# Patient Record
Sex: MALE | Race: WHITE
[De-identification: names, ages, dates, MRNs, and addresses within clinical notes are randomized per-mention and may not be internally consistent; named-entity substitution may affect disease eponyms.]

---

## 2017-02-21 ENCOUNTER — HOSPITAL ENCOUNTER (EMERGENCY)
Dept: HOSPITAL 17 - PHEFT | Age: 37
Discharge: HOME | End: 2017-02-21
Payer: COMMERCIAL

## 2017-02-21 VITALS
DIASTOLIC BLOOD PRESSURE: 99 MMHG | TEMPERATURE: 98.7 F | RESPIRATION RATE: 20 BRPM | SYSTOLIC BLOOD PRESSURE: 147 MMHG | OXYGEN SATURATION: 97 % | HEART RATE: 90 BPM

## 2017-02-21 VITALS — HEIGHT: 71 IN | WEIGHT: 196.21 LBS | BODY MASS INDEX: 27.47 KG/M2
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
## 2017-02-21 DIAGNOSIS — M25.462: Primary | ICD-10-CM

## 2017-02-21 DIAGNOSIS — M25.562: ICD-10-CM

## 2017-02-21 DIAGNOSIS — Z72.0: ICD-10-CM

## 2017-02-21 PROCEDURE — 73564 X-RAY EXAM KNEE 4 OR MORE: CPT

## 2017-02-21 PROCEDURE — L1830 KO IMMOB CANVAS LONG PRE OTS: HCPCS

## 2017-02-21 PROCEDURE — E0113 CRUTCH UNDERARM EACH WOOD: HCPCS

## 2017-02-21 PROCEDURE — 99283 EMERGENCY DEPT VISIT LOW MDM: CPT

## 2017-02-21 NOTE — RADHPO
EXAM DATE/TIME:  02/21/2017 21:04 

 

[facility] COMPARISON:     

No previous studies available for comparison.

 

                     

INDICATIONS :     

Left knee pain and swelling since last night when the patient heard a pop after wrestling.

                     

 

MEDICAL HISTORY :     

None.          

 

SURGICAL HISTORY :     

None.   

 

ENCOUNTER:     

Initial                                        

 

ACUITY:     

1 day      

 

PAIN SCORE:     

9/10

 

LOCATION:     

Left medial knee.

 

FINDINGS:     

Bones of the left knee are intact and normally aligned. There is a moderate joint effusion evident. E
xtra-articular soft tissues have a normal radiographic appearance.

 

CONCLUSION:     

Nonspecific joint effusion. No fracture or subluxation.

 

 

 

 Pierce Santamaria MD on February 21, 2017 at 21:15           

Board Certified Radiologist.

 This report was verified electronically.

## 2017-09-19 ENCOUNTER — HOSPITAL ENCOUNTER (EMERGENCY)
Dept: HOSPITAL 17 - PHED | Age: 37
Discharge: HOME | End: 2017-09-19
Payer: COMMERCIAL

## 2017-09-19 VITALS
RESPIRATION RATE: 16 BRPM | DIASTOLIC BLOOD PRESSURE: 68 MMHG | HEART RATE: 82 BPM | SYSTOLIC BLOOD PRESSURE: 153 MMHG | OXYGEN SATURATION: 99 %

## 2017-09-19 VITALS
RESPIRATION RATE: 20 BRPM | HEART RATE: 94 BPM | OXYGEN SATURATION: 97 % | DIASTOLIC BLOOD PRESSURE: 117 MMHG | TEMPERATURE: 98.4 F | SYSTOLIC BLOOD PRESSURE: 171 MMHG

## 2017-09-19 VITALS — HEIGHT: 71 IN | WEIGHT: 193.12 LBS | BODY MASS INDEX: 27.04 KG/M2

## 2017-09-19 DIAGNOSIS — F17.200: ICD-10-CM

## 2017-09-19 DIAGNOSIS — Z79.899: ICD-10-CM

## 2017-09-19 DIAGNOSIS — K29.20: Primary | ICD-10-CM

## 2017-09-19 LAB
ALP SERPL-CCNC: 72 U/L (ref 45–117)
ALT SERPL-CCNC: 112 U/L (ref 12–78)
ANION GAP SERPL CALC-SCNC: 7 MEQ/L (ref 5–15)
AST SERPL-CCNC: 58 U/L (ref 15–37)
BASOPHILS # BLD AUTO: 0 TH/MM3 (ref 0–0.2)
BASOPHILS NFR BLD: 0.8 % (ref 0–2)
BILIRUB SERPL-MCNC: 0.4 MG/DL (ref 0.2–1)
BUN SERPL-MCNC: 7 MG/DL (ref 7–18)
CHLORIDE SERPL-SCNC: 105 MEQ/L (ref 98–107)
EOSINOPHIL # BLD: 0.3 TH/MM3 (ref 0–0.4)
EOSINOPHIL NFR BLD: 4.2 % (ref 0–4)
ERYTHROCYTE [DISTWIDTH] IN BLOOD BY AUTOMATED COUNT: 13.3 % (ref 11.6–17.2)
GFR SERPLBLD BASED ON 1.73 SQ M-ARVRAT: 99 ML/MIN (ref 89–?)
HCO3 BLD-SCNC: 25.4 MEQ/L (ref 21–32)
HCT VFR BLD CALC: 44.7 % (ref 39–51)
HEMO FLAGS: (no result)
LYMPHOCYTES # BLD AUTO: 1.7 TH/MM3 (ref 1–4.8)
LYMPHOCYTES NFR BLD AUTO: 27.8 % (ref 9–44)
MAGNESIUM SERPL-MCNC: 1.9 MG/DL (ref 1.5–2.5)
MCH RBC QN AUTO: 31.7 PG (ref 27–34)
MCHC RBC AUTO-ENTMCNC: 34.4 % (ref 32–36)
MCV RBC AUTO: 92.2 FL (ref 80–100)
MONOCYTES NFR BLD: 7.2 % (ref 0–8)
NEUTROPHILS # BLD AUTO: 3.6 TH/MM3 (ref 1.8–7.7)
NEUTROPHILS NFR BLD AUTO: 60 % (ref 16–70)
PLATELET # BLD: 207 TH/MM3 (ref 150–450)
POTASSIUM SERPL-SCNC: 3.9 MEQ/L (ref 3.5–5.1)
RBC # BLD AUTO: 4.85 MIL/MM3 (ref 4.5–5.9)
SODIUM SERPL-SCNC: 137 MEQ/L (ref 136–145)
WBC # BLD AUTO: 6 TH/MM3 (ref 4–11)

## 2017-09-19 PROCEDURE — 96375 TX/PRO/DX INJ NEW DRUG ADDON: CPT

## 2017-09-19 PROCEDURE — 99284 EMERGENCY DEPT VISIT MOD MDM: CPT

## 2017-09-19 PROCEDURE — 96374 THER/PROPH/DIAG INJ IV PUSH: CPT

## 2017-09-19 PROCEDURE — 80053 COMPREHEN METABOLIC PANEL: CPT

## 2017-09-19 PROCEDURE — C9113 INJ PANTOPRAZOLE SODIUM, VIA: HCPCS

## 2017-09-19 PROCEDURE — 93005 ELECTROCARDIOGRAM TRACING: CPT

## 2017-09-19 PROCEDURE — 85025 COMPLETE CBC W/AUTO DIFF WBC: CPT

## 2017-09-19 PROCEDURE — 83735 ASSAY OF MAGNESIUM: CPT

## 2017-09-19 PROCEDURE — 96361 HYDRATE IV INFUSION ADD-ON: CPT

## 2017-09-19 PROCEDURE — 83690 ASSAY OF LIPASE: CPT

## 2017-09-19 NOTE — PD
HPI


Chief Complaint:  Alcohol/Drug Intoxication


Time Seen by Provider:  16:42


Travel History


International Travel<30 days:  No


Contact w/Intl Traveler<30days:  No


Traveled to known affect area:  No





History of Present Illness


HPI


This 36-year-old male says he been feeling a little fuzzy.  He is a nonalcohol 

gil for 5 or 6 days.  He has a history of alcohol abuse in the past.  He 

denies recent drug use.  He does have a history of hepatitis C.  He had some 

pain in the right upper quadrant which then moved over to the mid abdomen and 

lower chest area.  He has been feeling shaky.  There is been no vomiting or 

diarrhea.  Pain is mild to moderate at this time





PFS


Past Medical History


Depression:  Yes


Diminished Hearing:  No


Influenza Vaccination:  No


Pregnant?:  Not Pregnant





Past Surgical History


Surgical History:  No Previous Surgery


Abdominal Surgery:  Yes (Hernia)


Other Surgery:  Yes (Spider cyst )





Social History


Alcohol Use:  Yes (Occ.)


Tobacco Use:  Yes (< 1/2 PPD)


Substance Use:  No





Allergies-Medications


(Allergen,Severity, Reaction):  


Coded Allergies:  


     No Known Allergies (Verified , 9/19/17)


Reported Meds & Prescriptions





Reported Meds & Active Scripts


Active


No Active Prescriptions or Reported Medications    








Review of Systems


General / Constitutional:  No: Fever, Chills


Eyes:  No: Diploplia, Blurred Vision


HENT:  No: Headaches, Vertigo


Cardiovascular:  Positive: Palpitations, No: Chest Pain or Discomfort


Respiratory:  No: Cough, Shortness of Breath


Gastrointestinal:  No: Nausea, Vomiting


Genitourinary:  No: Urgency, Frequency


Skin:  No Rash


Neurologic:  No: Weakness


Psychiatric:  Positive: Anxiety, Substance Abuse





Physical Exam


Narrative


GENERAL: Well developed male.  There is a mild tremor


SKIN: Focused skin assessment warm/dry.


HEAD: Atraumatic. Normocephalic. 


EYES: Pupils equal and round. No scleral icterus. No injection or drainage. 


ENT: No nasal bleeding or discharge.  Mucous membranes pink and moist.


NECK: Trachea midline. No JVD. 


CARDIOVASCULAR: Regular rate and rhythm.  No murmur appreciated.


RESPIRATORY: No accessory muscle use. Clear to auscultation. Breath sounds 

equal bilaterally. 


GASTROINTESTINAL: Abdomen soft, epigastric and right upper quadrant tenderness 

without guarding or rigidity, nondistended. Hepatic and splenic margins not 

palpable. 


MUSCULOSKELETAL: No obvious deformities. No clubbing.  No cyanosis.  No edema. 


NEUROLOGICAL: Awake and alert. No obvious cranial nerve deficits.  Motor 

grossly within normal limits. Normal speech.


PSYCHIATRIC: Appropriate mood and affect; insight and judgment normal.





Data


Data


Last Documented VS





Vital Signs








  Date Time  Temp Pulse Resp B/P (MAP) Pulse Ox O2 Delivery O2 Flow Rate FiO2


 


9/19/17 16:28 98.4 94 20 171/117 (135) 97   








Orders





 Orders


Complete Blood Count With Diff (9/19/17 16:47)


Comprehensive Metabolic Panel (9/19/17 16:47)


Lipase (9/19/17 16:47)


Magnesium (Mg) (9/19/17 16:47)


Sodium Chlor 0.9% 1000 Ml Inj (Ns 1000 M (9/19/17 17:00)


Sodium Chlor 0.9% 1000 Ml Inj (Ns 1000 M (9/19/17 17:00)


Pantoprazole Inj (Protonix Inj) (9/19/17 17:00)


Lorazepam Inj (Ativan Inj) (9/19/17 17:00)


Electrocardiogram (9/19/17 16:54)





Labs





Laboratory Tests








Test


  9/19/17


17:05


 


White Blood Count 6.0 TH/MM3 


 


Red Blood Count 4.85 MIL/MM3 


 


Hemoglobin 15.4 GM/DL 


 


Hematocrit 44.7 % 


 


Mean Corpuscular Volume 92.2 FL 


 


Mean Corpuscular Hemoglobin 31.7 PG 


 


Mean Corpuscular Hemoglobin


Concent 34.4 % 


 


 


Red Cell Distribution Width 13.3 % 


 


Platelet Count 207 TH/MM3 


 


Mean Platelet Volume 7.1 FL 


 


Neutrophils (%) (Auto) 60.0 % 


 


Lymphocytes (%) (Auto) 27.8 % 


 


Monocytes (%) (Auto) 7.2 % 


 


Eosinophils (%) (Auto) 4.2 % 


 


Basophils (%) (Auto) 0.8 % 


 


Neutrophils # (Auto) 3.6 TH/MM3 


 


Lymphocytes # (Auto) 1.7 TH/MM3 


 


Monocytes # (Auto) 0.4 TH/MM3 


 


Eosinophils # (Auto) 0.3 TH/MM3 


 


Basophils # (Auto) 0.0 TH/MM3 


 


CBC Comment DIFF FINAL 


 


Differential Comment  


 


Blood Urea Nitrogen 7 MG/DL 


 


Creatinine 0.88 MG/DL 


 


Random Glucose 96 MG/DL 


 


Total Protein 7.5 GM/DL 


 


Albumin 3.6 GM/DL 


 


Calcium Level 8.6 MG/DL 


 


Magnesium Level 1.9 MG/DL 


 


Alkaline Phosphatase 72 U/L 


 


Aspartate Amino Transf


(AST/SGOT) 58 U/L 


 


 


Alanine Aminotransferase


(ALT/SGPT) 112 U/L 


 


 


Total Bilirubin 0.4 MG/DL 


 


Sodium Level 137 MEQ/L 


 


Potassium Level 3.9 MEQ/L 


 


Chloride Level 105 MEQ/L 


 


Carbon Dioxide Level 25.4 MEQ/L 


 


Anion Gap 7 MEQ/L 


 


Estimat Glomerular Filtration


Rate 99 ML/MIN 


 


 


Lipase 134 U/L 











MDM


Medical Decision Making


Medical Screen Exam Complete:  Yes


Emergency Medical Condition:  Yes


Medical Record Reviewed:  Yes


Differential Diagnosis


Differential includes alcoholic gastritis, hepatitis, withdrawal


Narrative Course


Liver function tests are just minimally elevated.  Lab work is otherwise 

unremarkable.  He's been given some fluids and Ativan and feels better.  He'll 

be released with prescription for Librium





Diagnosis





 Primary Impression:  


 Gastritis


 Qualified Codes:  K29.20 - Alcoholic gastritis without bleeding


Scripts


Chlordiazepoxide HCl (Chlordiazepoxide HCl) 25 Mg Capsule


1 TAB PO Q6HR for Alcohol Detox, #10


   Prov: Robert Rodríguez MD         9/19/17


Disposition:  01 DISCHARGE HOME


Condition:  Stable











Robert Rodríguez MD Sep 19, 2017 16:54

## 2017-09-20 NOTE — EKG
Date Performed: 09/19/2017       Time Performed: 17:00:02

 

PTAGE:      36 years

 

EKG:      Sinus rhythm 

 

 NORMAL ECG 

 

NO PREVIOUS TRACING            

 

DOCTOR:   Wilber Rodriguez  Interpretating Date/Time  09/20/2017 20:49:41

## 2018-05-29 ENCOUNTER — HOSPITAL ENCOUNTER (EMERGENCY)
Dept: HOSPITAL 17 - NEPD | Age: 38
LOS: 1 days | Discharge: PSYCHIATRIC OR SUBSTANCE USE FACILITY | End: 2018-05-30
Payer: SELF-PAY

## 2018-05-29 VITALS — WEIGHT: 171.96 LBS | BODY MASS INDEX: 24.62 KG/M2 | HEIGHT: 70 IN

## 2018-05-29 VITALS
HEART RATE: 94 BPM | DIASTOLIC BLOOD PRESSURE: 77 MMHG | RESPIRATION RATE: 15 BRPM | SYSTOLIC BLOOD PRESSURE: 144 MMHG | TEMPERATURE: 98.7 F | OXYGEN SATURATION: 95 %

## 2018-05-29 DIAGNOSIS — F17.200: ICD-10-CM
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
## 2018-05-29 DIAGNOSIS — F10.920: ICD-10-CM

## 2018-05-29 DIAGNOSIS — S00.03XA: Primary | ICD-10-CM

## 2018-05-29 PROCEDURE — 72125 CT NECK SPINE W/O DYE: CPT

## 2018-05-29 PROCEDURE — 70450 CT HEAD/BRAIN W/O DYE: CPT

## 2018-05-29 NOTE — RADRPT
EXAM DATE:  5/29/2018 11:28 PM EDT

AGE/SEX:        37 years / Male



INDICATIONS:  Trauma fall, laceration to forehead. 



CLINICAL DATA:  This is the patient's initial encounter. Patient reports that signs and symptoms have
 been present for 1 day and indicates a pain score of 0/10. 

                                                                          

MEDICAL/SURGICAL HISTORY:   None. None.



RADIATION DOSE:  64.63 CTDI (mGy)







COMPARISON:      No prior exams available for comparison.





TECHNIQUE:  CT of the head without contrast.  Using automated exposure control and adjustment of the 
mA and/or kV according to patient size, radiation dose was kept as low as reasonably achievable to ob
tain optimal diagnostic quality images.



FINDINGS: 

There is no evidence for intracranial hemorrhage, mass effect, mass lesions, edema, or extra-axial fl
uid collections.  The visualized bony structures appear intact.  The ventricles are normal size for t
he patient's age.  There is chronic mucoperiosteal thickening within the left sphenoid sinus. There a
re no signs of acute infarction for technique.  



CONCLUSION:  Unremarkable study except for chronic sinusitis left sphenoid sinus. 



Electronically signed by: JENA Montgomery MD  5/29/2018 11:30 PM EDT

## 2018-05-29 NOTE — RADRPT
EXAM DATE:  5/29/2018 11:31 PM EDT

AGE/SEX:        37 years / Male



INDICATIONS:  Trauma, fall. 



CLINICAL DATA:  This is the patient's initial encounter. Patient reports that signs and symptoms have
 been present for 1 day and indicates a pain score of 0/10. 

                                                                          

MEDICAL/SURGICAL HISTORY:       None. None.



RADIATION DOSE:  20.37 CTDI (mGy)







COMPARISON:      No prior exams available for comparison.



TECHNIQUE:  Contiguous axial images were obtained using helical multirow detector technique.  The vol
umetric data was post-processed with multiplanar reconstruction in oblique axial, sagittal, and coron
al planes. Using automated exposure control and adjustment of the mA and/or kV according to patient s
ize, radiation dose was kept as low as reasonably achievable to obtain optimal diagnostic quality kriss
ges.



FINDINGS: 

No significant subluxation or soft tissue swelling is seen.  No definite fracture is identified for t
echnique.  



C2-C3:  No appreciable compromise to the thecal sac, exiting nerve roots are seen. The neural foramin
a are patent bilaterally. No appreciable thecal sac stenosis is seen.

C3-C4:  No appreciable compromise to the thecal sac, exiting nerve roots are seen. The neural foramin
a are patent bilaterally. No appreciable thecal sac stenosis is seen.

C4-C5:  No appreciable compromise to the thecal sac, exiting nerve roots are seen. The neural foramin
a are patent bilaterally. No appreciable thecal sac stenosis is seen.

C5-C6:  No appreciable compromise to the thecal sac, exiting nerve roots are seen. The neural foramin
a are patent bilaterally. No appreciable thecal sac stenosis is seen.

C6-C7:  No appreciable compromise to the thecal sac, exiting nerve roots are seen. The neural foramin
a are patent bilaterally. No appreciable thecal sac stenosis is seen.

C7-T1:  No appreciable compromise to the thecal sac, exiting nerve roots are seen. The neural foramin
a are patent bilaterally. No appreciable thecal sac stenosis is seen.



CONCLUSION:  Unremarkable study.  



Electronically signed by: JENA Montgomery MD  5/29/2018 11:37 PM EDT

## 2018-05-29 NOTE — PD
HPI


Chief Complaint:  Medical Clearance


Time Seen by Provider:  22:36


Travel History


International Travel<30 days:  No


Contact w/Intl Traveler<30days:  No


Traveled to known affect area:  No





History of Present Illness


HPI


Patient is a 37-year-old male presenting to the emergency department from 

[facility] for medical clearance.  Patient was brought there under [facility] 

act, while there he fell and hit his head.  Patient is intoxicated, he reports 

that he also used heroin.  He reports a headache but denies any nausea, vomiting

, dizziness, visual changes.  Patient does not rate his pain, he is less than 

fully cooperative with exam likely secondary to intoxication.





Formerly [facility]


Past Medical History


Medical History:  Unable to Obtain


Depression:  Yes


Diminished Hearing:  No





Past Surgical History


Surgical History:  Unable to Obtain


Abdominal Surgery:  Yes (Hernia)


Other Surgery:  Yes (Spider cyst )





Social History


Alcohol Use:  Yes (Occ.)


Tobacco Use:  Yes (< 1/2 PPD)


Substance Use:  Yes





Allergies-Medications


(Allergen,Severity, Reaction):  


Coded Allergies:  


     No Known Allergies (Verified , 9/19/17)


Reported Meds & Prescriptions





Reported Meds & Active Scripts


Active


Chlordiazepoxide HCl 25 Mg Capsule 1 Tab PO Q6HR








Review of Systems


ROS Limitations:  Intoxication


Except as stated in HPI:  all other systems reviewed are Neg


HENT:  Positive: Headaches





Physical Exam


Narrative


GENERAL: Well-developed, well-nourished, alert intoxicated appearing  

male


SKIN: Warm and dry.


HEAD: Normocephalic.  2 small contusion to anterior scalp.  Abrasion to forehead


EYES: Pupils equal and round. No scleral icterus. No injection or drainage.  

Extraocular movements are intact.


ENT: No nasal bleeding or discharge.  Mucous membranes pink and moist.


NECK: Trachea midline. No JVD. 


CARDIOVASCULAR: Regular rate and rhythm.  


RESPIRATORY: No accessory muscle use. Clear to auscultation. Breath sounds 

equal bilaterally. 


GASTROINTESTINAL: Abdomen soft, non-tender, nondistended. Hepatic and splenic 

margins not palpable. 


MUSCULOSKELETAL: Extremities without clubbing, cyanosis, or edema. No obvious 

deformities. 


NEUROLOGICAL: Awake and alert. No obvious cranial nerve deficits.  Motor 

grossly within normal limits. Five out of 5 muscle strength in the arms and 

legs.  Normal speech.


PSYCHIATRIC: Depressed mood and affect; insight and judgment normal.





Data


Data


Last Documented VS





Vital Signs








  Date Time  Temp Pulse Resp B/P (MAP) Pulse Ox O2 Delivery O2 Flow Rate FiO2


 


5/29/18 22:33 98.7 94 15 144/77 (99) 95   








Orders





 Orders


Ct Brain W/O Iv Contrast(Rout) (5/29/18 )


Ct Cerv Spine W/O Contrast (5/29/18 )








MDM


Medical Decision Making


Medical Screen Exam Complete:  Yes


Emergency Medical Condition:  Yes


Interpretation(s)





Last Impressions








Head CT 5/29/18 0000 Signed





Impressions: 





 CONCLUSION:  Unremarkable study except for chronic sinusitis left sphenoid sinu





 s. 





  





 


 


Cervical Spine CT 5/29/18 0000 Signed





Impressions: 





 CONCLUSION:  Unremarkable study.  





  





 








Vital Signs








  Date Time  Temp Pulse Resp B/P (MAP) Pulse Ox O2 Delivery O2 Flow Rate FiO2


 


5/29/18 22:33 98.7 94 15 144/77 (99) 95   








Differential Diagnosis


Contusion versus hemorrhage versus fracture versus other


Narrative Course


Patient is 37-year-old male presenting from [facility] for medical 

clearance after falling and hitting his head.  Patient has no focal deficits on 

exam.  Patient is clearly intoxicated.  CT scans ordered and pending.  Patient'

s vital signs are stable.  CT scan of the brain and cervical spine are negative 

for acute abnormalities.  Patient is medically cleared to return to [facility].





Diagnosis





 Primary Impression:  


 Contusion of head


 Qualified Codes:  S00.03XA - Contusion of scalp, initial encounter


 Additional Impressions:  


 Fall


 Qualified Codes:  W19.XXXA - Unspecified fall, initial encounter


 Intoxication


Referrals:  


[facility]


Patient Instructions:  Contusion in Adults (ED), General Instructions, Head 

Injury (ED)





***Additional Instructions:  


Return to emergency department immediately for any new or worsening symptoms


Avoid excessive intake of alcohol


Drink more water


***Med/Other Pt SpecificInfo:  No Change to Meds


Disposition:  65 DISC TO PSYCH CARE FACILITY


Condition:  Stable











Phillip,Eveliasachin MEJIA May 29, 2018 22:54

## 2022-06-23 NOTE — PD
HPI


Chief Complaint:  Injury


Time Seen by Provider:  20:28


Travel History


International Travel<30 days:  No


Contact w/Intl Traveler<30days:  No


Traveled to known affect area:  No





History of Present Illness


HPI


36 year old male presents to the ED for evaluation of left knee pain.  Onset 

approximately 24 hours ago while roughhousing with his 15-year-old son.  

Patient states that he planted leg behind him, fell backwards onto the leg, 

heard and felt a few "pops."  He has been ambulatory since the accident.  

However he states he feels like the leg is "wobbly" and unstable.  He denies 

crepitus, giving way, increased pain with weightbearing, numbness, tingling, 

weakness or limitations to range of motion.  He denies previous injury to the 

left knee.  He was able to work today.  No treatment attempted at home.





PFSH


Past Medical History


Medical History:  Denies Significant Hx


Depression:  Yes


Diminished Hearing:  No


Tetanus Vaccination:  < 5 Years


Influenza Vaccination:  Yes





Past Surgical History


Abdominal Surgery:  Yes (Hernia)


Other Surgery:  Yes (Spider cyst )





Social History


Alcohol Use:  Yes (Occ.)


Tobacco Use:  Yes (< 1/2 PPD)


Substance Use:  No





Allergies-Medications


(Allergen,Severity, Reaction):  


Coded Allergies:  


     No Known Allergies (Verified , 2/21/17)


Reported Meds & Prescriptions





Reported Meds & Active Scripts


Active


Ibuprofen 800 Mg Tab 800 Mg PO Q8H








Review of Systems


Except as stated in HPI:  all other systems reviewed are Neg





Physical Exam


Narrative


GENERAL: Well-nourished, well-developed white male in no acute distress.


SKIN: Warm and dry.


HEAD: Normocephalic.


EYES: No scleral icterus. No injection or drainage. 


NECK: Supple, trachea midline. No JVD or lymphadenopathy.


CARDIOVASCULAR: Regular rate and rhythm without murmurs, gallops, or rubs. 


RESPIRATORY: Breath sounds equal bilaterally. No accessory muscle use.


GASTROINTESTINAL: Abdomen soft, non-tender, nondistended. 


MUSCULOSKELETAL: No cyanosis, or edema. 


FOCUSED LEFT LOWER EXTREMITY EXAM: Moderate edema. No patellar balloting.  

Extension and flexion of the knee elicits pain.  Palpation of the medial and 

lateral joint line elicits pain.  Varus, valgus stress testing elicits pain.  

Anterior drawer testing negative.  2+ DP pulse.  Sensation intact to light 

touch distally.  Cap refill less than 2 seconds.


BACK: Nontender without obvious deformity. No CVA tenderness.





Data


Data


Last Documented VS





Vital Signs








  Date Time  Temp Pulse Resp B/P Pulse Ox O2 Delivery O2 Flow Rate FiO2


 


2/21/17 20:15 98.7 90 20 147/99 97   








Orders





 Knee, Complete (4vws) (2/21/17 20:26)


Ice/Cold Pack (2/21/17 20:26)


Crutches (2/21/17 21:26)


^ Ace Bandage (2/21/17 21:26)


^ Knee Immobilizer (2/21/17 21:26)


Immobilizer Knee 20 Inch (2/21/17 )








MDM


Medical Decision Making


Medical Screen Exam Complete:  Yes


Emergency Medical Condition:  Yes


Differential Diagnosis


MCL tear versus LCL tear versus ACL tear versus PCL tear versus meniscal tear 

versus joint effusion versus fracture versus dislocation versus other


Narrative Course


36 year old male presents to the ED for evaluation of left knee pain.  Onset 

approximately 24 hours ago while roughhousing with his 15-year-old son.  

Patient states that he planted leg behind him, fell backwards onto the leg, 

heard and felt a few "pops."  Endorses instability of the leg in the medial and 

lateral direction. He has been ambulatory since the accident. He denies crepitus

, giving way, increased pain with weightbearing, numbness, tingling, weakness 

or limitations to range of motion.  He denies previous injury to the left knee.

  He was able to work today.  Vitals reviewed.  Focused left lower extremity 

exam reveals moderate edema. No patellar balloting.  Extension and flexion of 

the knee elicits pain.  Palpation of the medial and lateral joint line elicits 

pain.  Varus, valgus stress testing elicits pain.  Anterior drawer testing 

negative.  2+ DP pulse.  Sensation intact to light touch distally.  Cap refill 

less than 2 seconds.  Ice pack was applied.  X-rays of the knee reveal moderate 

joint effusion with no fracture or subluxation per radiology read.  Test 

results of the x-ray with the patient.  The knee was wrapped in an Ace wrap and 

immobilizer was placed.  He is provided with a pair of crutches.  He is 

instructed to rest, ice, elevate the extremity.  Gentle toe-touch weightbearing 

as tolerated.  Follow-up with the orthopedist for further evaluation.  We 

discussed the importance of taking anti-inflammatories and resting the leg.  He 

indicated understanding of the instructions and was amenable to the plan of 

care.  This patient is stable and discharged home.





Diagnosis





 Primary Impression:  


 Joint effusion of knee


 Additional Impression:  


 Left medial knee pain


Referrals:  


Beto Porras MD


Patient Instructions:  General Instructions, Knee Immobilizer (ED), Knee Pain (

ED)





***Additional Instructions:


Rest, ice, elevate the extremity.


Apply ice no longer than 10-15 minutes per hour a few times a day.


800 mg ibuprofen up to 3 times a day as needed for pain.


Return to normal, gentle activity as tolerated.


Weight-bearing as tolerated.


Wear brace except when showering or sleeping.


No running, jumping activities until cleared by the orthopedist


Follow up with Dr. Porras this week as discussed


Return to the ED for any urgent or emergent medical condition.


***Med/Other Pt SpecificInfo:  Prescription(s) given


Scripts


Ibuprofen 800 Mg Lcc695 Mg PO Q8H  #20 TAB  Ref 0


   Prov:Mike Chapman MD         2/21/17


Disposition:  01 DISCHARGE HOME


Condition:  Stable








Velma Michaud Feb 21, 2017 20:28
Strong peripheral pulses